# Patient Record
Sex: FEMALE | Race: WHITE | Employment: UNEMPLOYED | URBAN - METROPOLITAN AREA
[De-identification: names, ages, dates, MRNs, and addresses within clinical notes are randomized per-mention and may not be internally consistent; named-entity substitution may affect disease eponyms.]

---

## 2024-10-04 ENCOUNTER — HOSPITAL ENCOUNTER (OUTPATIENT)
Age: 19
Discharge: HOME | End: 2024-10-04
Attending: STUDENT IN AN ORGANIZED HEALTH CARE EDUCATION/TRAINING PROGRAM
Payer: COMMERCIAL

## 2024-10-04 ENCOUNTER — APPOINTMENT (OUTPATIENT)
Dept: CT IMAGING | Age: 19
End: 2024-10-04
Attending: STUDENT IN AN ORGANIZED HEALTH CARE EDUCATION/TRAINING PROGRAM
Payer: COMMERCIAL

## 2024-10-04 VITALS
TEMPERATURE: 98 F | OXYGEN SATURATION: 100 % | SYSTOLIC BLOOD PRESSURE: 101 MMHG | RESPIRATION RATE: 18 BRPM | DIASTOLIC BLOOD PRESSURE: 74 MMHG | HEART RATE: 93 BPM

## 2024-10-04 DIAGNOSIS — R42 VERTIGO: Primary | ICD-10-CM

## 2024-10-04 LAB
#MXD IC: 0.3 X10ˆ3/UL (ref 0.1–1)
ATRIAL RATE: 88 BPM
B-HCG UR QL: NEGATIVE
BILIRUB UR QL STRIP: NEGATIVE
BUN BLD-MCNC: 12 MG/DL (ref 7–18)
CHLORIDE BLD-SCNC: 101 MMOL/L (ref 98–112)
CLARITY UR: CLEAR
CO2 BLD-SCNC: 25 MMOL/L (ref 21–32)
COLOR UR: YELLOW
CREAT BLD-MCNC: 0.5 MG/DL
EGFRCR SERPLBLD CKD-EPI 2021: 137 ML/MIN/1.73M2 (ref 60–?)
GLUCOSE BLD-MCNC: 118 MG/DL (ref 70–99)
GLUCOSE UR STRIP-MCNC: NEGATIVE MG/DL
HCT VFR BLD AUTO: 39 %
HCT VFR BLD CALC: 41 %
HGB BLD-MCNC: 12.6 G/DL
HGB UR QL STRIP: NEGATIVE
ISTAT IONIZED CALCIUM FOR CHEM 8: 1.29 MMOL/L (ref 1.12–1.32)
KETONES UR STRIP-MCNC: NEGATIVE MG/DL
LEUKOCYTE ESTERASE UR QL STRIP: NEGATIVE
LYMPHOCYTES # BLD AUTO: 2.1 X10ˆ3/UL (ref 1–4)
LYMPHOCYTES NFR BLD AUTO: 25.9 %
MCH RBC QN AUTO: 29.9 PG (ref 26–34)
MCHC RBC AUTO-ENTMCNC: 32.3 G/DL (ref 31–37)
MCV RBC AUTO: 92.6 FL (ref 80–100)
MIXED CELL %: 4 %
NEUTROPHILS # BLD AUTO: 5.8 X10ˆ3/UL (ref 1.5–7.7)
NEUTROPHILS NFR BLD AUTO: 70.1 %
NITRITE UR QL STRIP: NEGATIVE
P AXIS: -10 DEGREES
P-R INTERVAL: 122 MS
PH UR STRIP: 6 [PH]
PLATELET # BLD AUTO: 272 X10ˆ3/UL (ref 150–450)
POCT INFLUENZA A: NEGATIVE
POCT INFLUENZA B: NEGATIVE
POTASSIUM BLD-SCNC: 4.5 MMOL/L (ref 3.6–5.1)
PROT UR STRIP-MCNC: NEGATIVE MG/DL
Q-T INTERVAL: 348 MS
QRS DURATION: 72 MS
QTC CALCULATION (BEZET): 421 MS
R AXIS: 77 DEGREES
RBC # BLD AUTO: 4.21 X10ˆ6/UL
SARS-COV-2 RNA RESP QL NAA+PROBE: NOT DETECTED
SODIUM BLD-SCNC: 139 MMOL/L (ref 136–145)
SP GR UR STRIP: >=1.03
T AXIS: 21 DEGREES
TROPONIN I BLD-MCNC: <0.02 NG/ML
UROBILINOGEN UR STRIP-ACNC: <2 MG/DL
VENTRICULAR RATE: 88 BPM
WBC # BLD AUTO: 8.2 X10ˆ3/UL (ref 4–11)

## 2024-10-04 PROCEDURE — 96374 THER/PROPH/DIAG INJ IV PUSH: CPT

## 2024-10-04 PROCEDURE — 80047 BASIC METABLC PNL IONIZED CA: CPT

## 2024-10-04 PROCEDURE — 84484 ASSAY OF TROPONIN QUANT: CPT

## 2024-10-04 PROCEDURE — 85025 COMPLETE CBC W/AUTO DIFF WBC: CPT | Performed by: STUDENT IN AN ORGANIZED HEALTH CARE EDUCATION/TRAINING PROGRAM

## 2024-10-04 PROCEDURE — 96361 HYDRATE IV INFUSION ADD-ON: CPT

## 2024-10-04 PROCEDURE — 93010 ELECTROCARDIOGRAM REPORT: CPT

## 2024-10-04 PROCEDURE — 99204 OFFICE O/P NEW MOD 45 MIN: CPT

## 2024-10-04 PROCEDURE — 81002 URINALYSIS NONAUTO W/O SCOPE: CPT

## 2024-10-04 PROCEDURE — 87502 INFLUENZA DNA AMP PROBE: CPT | Performed by: STUDENT IN AN ORGANIZED HEALTH CARE EDUCATION/TRAINING PROGRAM

## 2024-10-04 PROCEDURE — 70450 CT HEAD/BRAIN W/O DYE: CPT | Performed by: STUDENT IN AN ORGANIZED HEALTH CARE EDUCATION/TRAINING PROGRAM

## 2024-10-04 PROCEDURE — 93005 ELECTROCARDIOGRAM TRACING: CPT

## 2024-10-04 PROCEDURE — 99205 OFFICE O/P NEW HI 60 MIN: CPT

## 2024-10-04 PROCEDURE — 81025 URINE PREGNANCY TEST: CPT

## 2024-10-04 RX ORDER — MECLIZINE HYDROCHLORIDE 25 MG/1
25 TABLET ORAL ONCE
Status: DISCONTINUED | OUTPATIENT
Start: 2024-10-04 | End: 2024-10-04

## 2024-10-04 RX ORDER — ONDANSETRON 2 MG/ML
4 INJECTION INTRAMUSCULAR; INTRAVENOUS ONCE
Status: COMPLETED | OUTPATIENT
Start: 2024-10-04 | End: 2024-10-04

## 2024-10-04 RX ORDER — SODIUM CHLORIDE 9 MG/ML
1000 INJECTION, SOLUTION INTRAVENOUS ONCE
Status: COMPLETED | OUTPATIENT
Start: 2024-10-04 | End: 2024-10-04

## 2024-10-04 RX ORDER — MECLIZINE HCL 12.5 MG 12.5 MG/1
12.5 TABLET ORAL EVERY 6 HOURS PRN
Qty: 8 TABLET | Refills: 0 | Status: SHIPPED | OUTPATIENT
Start: 2024-10-04 | End: 2024-10-06

## 2024-10-04 RX ORDER — MECLIZINE HYDROCHLORIDE 25 MG/1
12.5 TABLET ORAL ONCE
Status: COMPLETED | OUTPATIENT
Start: 2024-10-04 | End: 2024-10-04

## 2024-10-04 NOTE — ED QUICK NOTES
Ambulated to bathroom with steady gait. Unable to proved urine specimen. Vomited while in the bathroom. Escorted back to room. IV accessed. Zofran given for nausea and vomiting.

## 2024-10-04 NOTE — DISCHARGE INSTRUCTIONS
Your lab work showed normal electrolytes, no anemia, no pregnancy, no urinary tract infection, and your blood work and EKG showed no sign of heart injury.  The CT scan of your brain showed no bleeding on the brain.    Your story and exam is consistent with benign paroxysmal positional vertigo for which I do recommend you follow-up with neurology.  I prescribed a medication called Meclizine that can be taken as needed every 6 hours for vertigo symptoms.  You should not drive or operate heavy machinery or participate in activities that require focus while taking this medication as this medication can cause drowsiness.  You cannot take another dose until it has been 6 hours since the dose given in clinic today.    However, if there is persistence or progression of symptoms, headaches, vision changes, weakness or numbness or tingling in extremity, you should call 911 and present immediately to the emergency department.

## 2024-10-04 NOTE — ED INITIAL ASSESSMENT (HPI)
Onset of dizziness \"room spinning\" this morning. Vomited x 1. Denies headache. Couple episodes of diarrhea last few days. No fever. Denies abdominal pain. Denies urinary symptoms.

## 2024-10-04 NOTE — ED PROVIDER NOTES
Patient Seen in: [facility]      History     Chief Complaint   Patient presents with    Dizziness     Stated Complaint: dizzy and vomitting    Subjective:   HPI    19-year-old Micronesian-speaking female with no significant past medical history, who presents with her significant other and her daughter, with concern for vertigo with positional changes but also feels lightheaded, symptoms started approximate 5.5 hours prior to arrival after waking up this morning, 1 episode of nonbloody nonbilious emesis with no associated abdominal pain, and 2 episodes of watery nonbloody diarrhea.  She denies any urinary symptoms.  Denies any fevers.  She denies any headache or vision changes.  She states this is occurred once a couple years ago but was not diagnosed pacific with anything.  She is unsure if she could be pregnant.  She denies any recent travel.    Objective:     History reviewed. No pertinent past medical history.           No pertinent past surgical history.              No pertinent social history.            Review of Systems    Positive for stated complaint: dizzy and vomitting  Other systems are as noted in HPI.  Constitutional and vital signs reviewed.      All other systems reviewed and negative except as noted above.    Physical Exam     ED Triage Vitals [10/04/24 1210]   /74   Pulse 93   Resp 18   Temp 97.6 °F (36.4 °C)   Temp src Temporal   SpO2 100 %   O2 Device None (Room air)       Current Vitals:   Vital Signs  BP: 101/74  Pulse: 93  Resp: 18  Temp: 97.6 °F (36.4 °C)  Temp src: Temporal    Oxygen Therapy  SpO2: 100 %  O2 Device: None (Room air)        Physical Exam    General: Awake, alert, comfortable on room air, in no distress, tolerating oral secretions, interactive, fatigued appearing but not lethargic  Pulmonary: Lungs CTA B, no wheezing, no recruitment, no conversational dyspnea  GI: Abdomen soft, nontender, nondistended, no rebound, no guarding  Neuro: CN 2-12 intact, No drift in  any extremity, normal gait, normal B/L finger-to-nose, normal B/L heel drag, symmetrical facial expressions on gross observation, positional changes such as laying flat and standing exacerbate her symptoms and reproduce the dizziness  Musculoskeletal: 5/5 B/L UE and LE strength  HEENT: PERRL, no direct or consensual photophobia, EOMI, no pain with extraocular muscle movement, no periorbital edema or erythema, nonerythematous and nonedematous intact B/L TMs, no erythema or edema of the B/L ear canals, nonerythematous and nonedematous B/L nasal turbinates, nonerythematous and nonedematous B/L tonsils, no tonsillar exudates, no peritonsillar edema, uvula midline, tolerating oral secretions, normal speech, no submandibular edema  Psych: Normal mood, normal affect  ED Course     Labs Reviewed   POCT ISTAT CHEM8 CARTRIDGE - Abnormal; Notable for the following components:       Result Value    ISTAT Glucose 118 (*)     ISTAT Creatinine 0.50 (*)     All other components within normal limits   ISTAT TROPONIN - Normal   POCT PREGNANCY URINE - Normal   POCT FLU TEST - Normal    Narrative:     This assay is a rapid molecular in vitro test utilizing nucleic acid amplification of influenza A and B viral RNA.   RAPID SARS-COV-2 BY PCR - Normal   POCT CBC   [facility] POCT URINALYSIS DIPSTICK     Collected 10/4/2024 13:46       Status: Final result    0 Result Notes      Component  Ref Range & Units 10/4/24 1346   Urine Color  Yellow Yellow   Urine Clarity  Clear Clear   Specific Gravity, Urine  1.005 - 1.030 >=1.030   PH, Urine  5.0 - 8.0 6.0   Protein urine  Negative mg/dL Negative   Glucose, Urine  Negative mg/dL Negative   Ketone, Urine  Negative mg/dL Negative   Bilirubin, Urine  Negative Negative   Blood, Urine  Negative Negative   Nitrite Urine  Negative Negative   Urobilinogen urine  <2.0 mg/dL <2.0   Leukocyte esterase urine  Negative Negative   [facility]              Specimen Collected: 10/04/24 13:46 Last  Resulted: 10/04/24 13:46        Collected 10/4/2024 13:45       Status: Final result    0 Result Notes      Component  Ref Range & Units 10/4/24 1345   POCT Urine Pregnancy  Negative Negative   [facility] ([facility])              Specimen Collected: 10/04/24 13:45 Last Resulted: 10/04/24 13:49        Collected 10/4/2024 12:38       Status: Final result    0 Result Notes      Component  Ref Range & Units 10/4/24 1238   WBC IC  4.0 - 11.0 x10ˆ3/uL 8.2   RBC IC  3.80 - 5.30 X10ˆ6/uL 4.21   HGB IC  12.0 - 16.0 g/dL 12.6   HCT IC  35.0 - 48.0 % 39.0   MCV IC  80.0 - 100.0 fL 92.6   MCH IC  26.0 - 34.0 pg 29.9   MCHC IC  31.0 - 37.0 g/dL 32.3   PLT IC  150.0 - 450.0 X10ˆ3/uL 272.0   # Neutrophil  1.5 - 7.7 X10ˆ3/uL 5.8   # Lymphocyte  1.0 - 4.0 X10ˆ3/uL 2.1   # Mixed Cells  0.1 - 1.0 X10ˆ3/uL 0.3   Neutrophil %  % 70.1   Lymphocyte %  % 25.9   Mixed Cell %  % 4.0   Resulting [facility] ([facility])              Specimen Collected: 10/04/24 12:38 Last Resulted: 10/04/24 12:53          Collected 10/4/2024 12:54       Status: Final result    0 Result Notes      Component  Ref Range & Units 10/4/24 1254   ISTAT Sodium  136 - 145 mmol/L 139   ISTAT BUN  7 - 18 mg/dL 12   ISTAT Potassium  3.6 - 5.1 mmol/L 4.5   ISTAT Chloride  98 - 112 mmol/L 101   ISTAT Ionized Calcium  1.12 - 1.32 mmol/L 1.29   ISTAT Hematocrit  34 - 50 % 41   ISTAT Glucose  70 - 99 mg/dL 118 High    ISTAT TCO2  21 - 32 mmol/L 25   ISTAT Creatinine  0.55 - 1.02 mg/dL 0.50 Low    Comment: This test may exhibit falsely elevated results when a sample is collected from a patient who is presently taking Hydroxyurea. If patient is consuming Hydroxyurea, i-STAT creatinine analysis should be considered inaccurate and a sample should be referred to the Central Lab for analysis, if clinically indicated.   eGFR-Cr  >=60 mL/min/1.73m2 137   [facility] ([facility])              Specimen Collected: 10/04/24 12:54 Last Resulted: 10/04/24 12:55        Collected  10/4/2024 12:56       Status: Final result    0 Result Notes      Component  Ref Range & Units 10/4/24 1256   ISTAT Troponin  <0.045 ng/mL ng/mL <0.02   Resulting [facility] ([facility])              Specimen Collected: 10/04/24 12:56 Last Resulted: 10/04/24 13:07          Copy of radiology report of patient's CT head:    Narrative   PROCEDURE: CT BRAIN OR HEAD (CPT=70450)55     COMPARISON: None.     INDICATIONS: dizziness that is positional, suspect BPPV     TECHNIQUE: CT images were obtained without contrast material.  Automated exposure control for dose reduction was used.  Dose information is transmitted to the ACR (American College of Radiology) NRDR (National Radiology Data Registry) which includes the  Dose Index Registry.     FINDINGS:  CSF SPACES: Ventricles, cisterns, and sulci are appropriate for age.  No hydrocephalus, subarachnoid hemorrhage, or mass.  No midline shift.    CEREBRUM: No edema, hemorrhage, mass, acute infarction, or significant atrophy.    WHITE MATTER: Normal white matter.    CEREBELLUM: No edema, hemorrhage, mass, acute infarction, or significant atrophy.    BRAINSTEM: No edema, hemorrhage, mass, acute infarction, or significant atrophy.    CALVARIUM: No apparent fracture, mass, or other significant visible lesion.    SINUSES: Limited views demonstrate no significant mucosal thickening or fluid.    ORBITS: Limited views are unremarkable.       OTHER: Negative.                 Impression   CONCLUSION:     No acute intracranial abnormality.           Dictated by (CST): Andrei Otoole MD on 10/04/2024 at 2:28 PM      Finalized by (CST): Andrei Otoole MD on 10/04/2024 at 2:30 PM       EKG    Rate, intervals and axes as noted on EKG Report.  Rate: HR 88  Rhythm: Sinus Rhythm  Reading: No ST elevation or depressions, no HCM, no WPW, no Brugada's, no prolonged QTc, no STEMI           -Patient reassessed after completing 1 L of IV fluids and 4 mg of IV Zofran, via language line she reports that  nausea has completely resolved, feels she can now go to the bathroom and will provide a urine sample but is still feeling vertiginous  -Patient's urine is negative for pregnancy, patient taken for CT head, meclizine administered on return  -30 minutes after meclizine, patient reassessed with Ukrainian language line, reports resolution of vertigo, feeling improved, able to ambulate and stand much more comfortably, appears much more comfortable in the room, able to ambulate out of immediate care comfortably  MDM   Patient is awake, alert, comfortable on room air, in no distress, afebrile, she is mildly fatigued appearing/nauseous appearing, she has a nonfocal neuroexam with normal strength and coordination but with any positional changes such as lying flat or standing she has reproduction/exacerbation of her vertigo, no sign of otitis media or otitis externa or TM perforation, suspect BPPV given this has occurred previously years ago as well as symptoms are reproduced with positional changes, will also assess with CTH for possible ICH, possible viral gastroenteritis given vomiting and diarrhea although less likely given no TTP throughout the abdomen, no sign of acute abdomen with no indication for advanced imaging of the abdomen, no recent travel and not recently on antibiotics with no suspicion for C. difficile or traveler's diarrhea, will assess CBC for possible anemia given reported lightheadedness, troponin and EKG for possible cardiac etiology given lightheadedness, and BMP for electrolytes and kidney function given vomiting and diarrhea, also assess for COVID and influenza given possible viral etiology  -Patient unable to provide initial urine sample, she had 1 episode of vomiting in the bathroom  -Patient given 1 L of IV fluids and 4 mg of IV Zofran  -Patient's EKG is nonischemic, no STEMI, no HCM, no WPW, no prolonged QTc, no Brugada's  -Patient's troponin is not elevated, rules out ACS  -Patient has an  unremarkable CBC with no leukocytosis and no anemia  -Patient has an unremarkable BMP with normal electrolytes and kidney function.  Blood glucose 118 which is normal given this is not fasting and with normal bicarbonate  -Patient's urine shows no sign of infection and negative pregnancy test, patient given 12.5 mg of meclizine  -Per my review of the radiology report, patient CT head shows no ICH  -All results were discussed with the patient via the [place]i language line, she reports feeling improved, complete resolution of her nausea and dizziness, patient able to stand comfortably in the room no further signs of nausea or fatigue in the room, she is able to ambulate out of the immediate care comfortably  -Patient prescribed meclizine, instructed this medication can cause drowsiness and not to drive or perform tasks that require focus while taking his medication as it can cause drowsiness.  Instructed she cannot take another dose until it has been 6 hours since the dose administered today.  -Patient encouraged to follow-up with neurology and information provided in discharge instructions  -ED precautions discussed    -Please note all encounters today were performed while using the Yemeni language line as patient is Yemeni-speaking.  Her significant other will drive her home given she was given meclizine today.    Medical Decision Making  Amount and/or Complexity of Data Reviewed  Labs: ordered.  Radiology: ordered.  ECG/medicine tests: ordered and independent interpretation performed.    Risk  Prescription drug management.        Disposition and Plan     Clinical Impression:  1. Vertigo         Disposition:  Discharge  10/4/2024  2:43 pm    Follow-up:  Martir Metcalf MD  [address] [place] [address] [id number] [phone]    In 1 week            Medications Prescribed:    meclizine 12.5 MG Oral Tab 8 tablet 0 10/4/2024 10/6/2024   Sig:   Take 1 tablet (12.5 mg total) by mouth every 6 (six) hours as  needed for Dizziness. Do not drive, operate heavy machinery, or perform tasks that require focus while on this medication as it can cause drowsiness.     Route:   Oral     PRN Reason(s):   Dizziness